# Patient Record
Sex: FEMALE | Race: BLACK OR AFRICAN AMERICAN | NOT HISPANIC OR LATINO | Employment: UNEMPLOYED | URBAN - NONMETROPOLITAN AREA
[De-identification: names, ages, dates, MRNs, and addresses within clinical notes are randomized per-mention and may not be internally consistent; named-entity substitution may affect disease eponyms.]

---

## 2019-06-18 ENCOUNTER — OFFICE VISIT (OUTPATIENT)
Dept: INTERNAL MEDICINE | Facility: CLINIC | Age: 74
End: 2019-06-18

## 2019-06-18 ENCOUNTER — APPOINTMENT (OUTPATIENT)
Dept: LAB | Facility: HOSPITAL | Age: 74
End: 2019-06-18

## 2019-06-18 VITALS
BODY MASS INDEX: 20.57 KG/M2 | HEIGHT: 66 IN | RESPIRATION RATE: 16 BRPM | HEART RATE: 78 BPM | OXYGEN SATURATION: 99 % | TEMPERATURE: 98.4 F | WEIGHT: 128 LBS | DIASTOLIC BLOOD PRESSURE: 57 MMHG | SYSTOLIC BLOOD PRESSURE: 100 MMHG

## 2019-06-18 DIAGNOSIS — I85.00 PORTAL HYPERTENSION WITH ESOPHAGEAL VARICES (HCC): ICD-10-CM

## 2019-06-18 DIAGNOSIS — R10.84 GENERALIZED ABDOMINAL PAIN: ICD-10-CM

## 2019-06-18 DIAGNOSIS — R10.12 LUQ ABDOMINAL PAIN: ICD-10-CM

## 2019-06-18 DIAGNOSIS — I81 PORTAL VEIN THROMBOSIS: ICD-10-CM

## 2019-06-18 DIAGNOSIS — K76.6 PORTAL HYPERTENSION WITH ESOPHAGEAL VARICES (HCC): ICD-10-CM

## 2019-06-18 DIAGNOSIS — K21.00 GASTROESOPHAGEAL REFLUX DISEASE WITH ESOPHAGITIS: ICD-10-CM

## 2019-06-18 DIAGNOSIS — I10 BENIGN ESSENTIAL HYPERTENSION: Primary | ICD-10-CM

## 2019-06-18 LAB
ALBUMIN SERPL-MCNC: 3.7 G/DL (ref 3.5–5)
ALBUMIN/GLOB SERPL: 1.2 G/DL (ref 1–2)
ALP SERPL-CCNC: 35 U/L (ref 38–126)
ALT SERPL W P-5'-P-CCNC: 30 U/L (ref 13–69)
ANION GAP SERPL CALCULATED.3IONS-SCNC: 13.4 MMOL/L (ref 10–20)
ANISOCYTOSIS BLD QL: NORMAL
AST SERPL-CCNC: 53 U/L (ref 15–46)
BASOPHILS # BLD AUTO: 0.08 10*3/MM3 (ref 0–0.2)
BASOPHILS NFR BLD AUTO: 0.8 % (ref 0–1.5)
BILIRUB SERPL-MCNC: 0.6 MG/DL (ref 0.2–1.3)
BUN BLD-MCNC: 22 MG/DL (ref 7–20)
BUN/CREAT SERPL: 27.5 (ref 7.1–23.5)
CALCIUM SPEC-SCNC: 8.7 MG/DL (ref 8.4–10.2)
CHLORIDE SERPL-SCNC: 100 MMOL/L (ref 98–107)
CO2 SERPL-SCNC: 25 MMOL/L (ref 26–30)
CREAT BLD-MCNC: 0.8 MG/DL (ref 0.6–1.3)
DACRYOCYTES BLD QL SMEAR: NORMAL
DEPRECATED RDW RBC AUTO: 40.1 FL (ref 37–54)
EOSINOPHIL # BLD AUTO: 0.35 10*3/MM3 (ref 0–0.4)
EOSINOPHIL NFR BLD AUTO: 3.7 % (ref 0.3–6.2)
ERYTHROCYTE [DISTWIDTH] IN BLOOD BY AUTOMATED COUNT: 21.9 % (ref 12.3–15.4)
GFR SERPL CREATININE-BSD FRML MDRD: 85 ML/MIN/1.73
GLOBULIN UR ELPH-MCNC: 3 GM/DL
GLUCOSE BLD-MCNC: 87 MG/DL (ref 74–98)
HCT VFR BLD AUTO: 17 % (ref 34–46.6)
HGB BLD-MCNC: 4.1 G/DL (ref 12–15.9)
HYPOCHROMIA BLD QL: NORMAL
IMM GRANULOCYTES # BLD AUTO: 0.16 10*3/MM3 (ref 0–0.05)
IMM GRANULOCYTES NFR BLD AUTO: 1.7 % (ref 0–0.5)
LYMPHOCYTES # BLD AUTO: 0.9 10*3/MM3 (ref 0.7–3.1)
LYMPHOCYTES NFR BLD AUTO: 9.5 % (ref 19.6–45.3)
MCH RBC QN AUTO: 13.1 PG (ref 26.6–33)
MCHC RBC AUTO-ENTMCNC: 24.1 G/DL (ref 31.5–35.7)
MCV RBC AUTO: 54.5 FL (ref 79–97)
MICROCYTES BLD QL: NORMAL
MONOCYTES # BLD AUTO: 0.84 10*3/MM3 (ref 0.1–0.9)
MONOCYTES NFR BLD AUTO: 8.9 % (ref 5–12)
NEUTROPHILS # BLD AUTO: 7.13 10*3/MM3 (ref 1.7–7)
NEUTROPHILS NFR BLD AUTO: 75.4 % (ref 42.7–76)
NRBC BLD AUTO-RTO: 0.2 /100 WBC (ref 0–0.2)
OVALOCYTES BLD QL SMEAR: NORMAL
PLATELET # BLD AUTO: 319 10*3/MM3 (ref 140–450)
PMV BLD AUTO: ABNORMAL FL (ref 6–12)
POIKILOCYTOSIS BLD QL SMEAR: NORMAL
POLYCHROMASIA BLD QL SMEAR: NORMAL
POTASSIUM BLD-SCNC: 4.4 MMOL/L (ref 3.5–5.1)
PROT SERPL-MCNC: 6.7 G/DL (ref 6.3–8.2)
RBC # BLD AUTO: 3.12 10*6/MM3 (ref 3.77–5.28)
SMALL PLATELETS BLD QL SMEAR: ADEQUATE
SODIUM BLD-SCNC: 134 MMOL/L (ref 137–145)
TSH SERPL DL<=0.05 MIU/L-ACNC: 3 MIU/ML (ref 0.47–4.68)
WBC MORPH BLD: NORMAL
WBC NRBC COR # BLD: 9.46 10*3/MM3 (ref 3.4–10.8)

## 2019-06-18 PROCEDURE — 99204 OFFICE O/P NEW MOD 45 MIN: CPT | Performed by: INTERNAL MEDICINE

## 2019-06-18 PROCEDURE — 83036 HEMOGLOBIN GLYCOSYLATED A1C: CPT | Performed by: INTERNAL MEDICINE

## 2019-06-18 PROCEDURE — 85007 BL SMEAR W/DIFF WBC COUNT: CPT | Performed by: INTERNAL MEDICINE

## 2019-06-18 PROCEDURE — 80053 COMPREHEN METABOLIC PANEL: CPT | Performed by: INTERNAL MEDICINE

## 2019-06-18 PROCEDURE — 36415 COLL VENOUS BLD VENIPUNCTURE: CPT | Performed by: INTERNAL MEDICINE

## 2019-06-18 PROCEDURE — 85025 COMPLETE CBC W/AUTO DIFF WBC: CPT | Performed by: INTERNAL MEDICINE

## 2019-06-18 PROCEDURE — 84443 ASSAY THYROID STIM HORMONE: CPT | Performed by: INTERNAL MEDICINE

## 2019-06-18 PROCEDURE — 80074 ACUTE HEPATITIS PANEL: CPT | Performed by: INTERNAL MEDICINE

## 2019-06-18 RX ORDER — SPIRONOLACTONE 25 MG/1
25 TABLET ORAL 2 TIMES DAILY
COMMUNITY
End: 2019-06-26 | Stop reason: SDUPTHER

## 2019-06-18 RX ORDER — PROPRANOLOL HYDROCHLORIDE 40 MG/1
40 TABLET ORAL DAILY
COMMUNITY
End: 2019-07-16

## 2019-06-18 RX ORDER — FUROSEMIDE 20 MG/1
20 TABLET ORAL DAILY
Qty: 30 TABLET | Refills: 5
Start: 2019-06-18

## 2019-06-18 RX ORDER — PANTOPRAZOLE SODIUM 40 MG/1
40 TABLET, DELAYED RELEASE ORAL DAILY
Qty: 30 TABLET | Refills: 6
Start: 2019-06-18 | End: 2019-07-18

## 2019-06-18 RX ORDER — HYDROCHLOROTHIAZIDE 25 MG/1
25 TABLET ORAL DAILY
COMMUNITY
End: 2019-06-18

## 2019-06-18 NOTE — PROGRESS NOTES
Subjective   Harry Gonzalez is a 73 y.o. female.     Chief Complaint   Patient presents with   • Establish Care   • Heartburn   • Abdominal Pain   • GI Problem   • Hypertension       History of Present Illness   HPI: Patient is here for an initial visit, she is from [facility] and is visiting her family and she is with her daughter who was translating for her today, patient is here to follow up on the blood pressure  The patient is taking the blood pressure medications as prescribed and has had no side effects. The patient is also here complaining of  Chronic abdominal pain in the left upper quadrant and epigastric area and heartburn, she has brought no paperwork from her physician in [place] dated 2009 which states that patient has portal vein thrombosis, and underwent EGD as she had esophageal varices , no liver cirrhosis , patient states she does not have any other symptoms at this time, according to her daughter she is eating well and denies any other GI symptoms as well.  Daughter says she was taken to a [facility] in [facility] and has undergone CAT scans and an EGD, she was supposed to have a repeat EGD but the patient refused and she is never undergone a colonoscopy, she is currently visiting this country had has no insurance and is returning back to [facility] in August 2019 . she does complain of  Chronic abdominal distention at times.  Hypertension   Pertinent negatives include no chest pain, palpitations or shortness of breath.    Review of Systems   Constitutional: Negative for appetite change, fatigue and fever.   HENT: Negative for congestion, ear discharge, ear pain, sinus pressure and sore throat.    Eyes: Negative for pain and discharge.   Respiratory: Negative for cough, chest tightness, shortness of breath and wheezing.    Cardiovascular: Negative for chest pain, palpitations and leg swelling.   Gastrointestinal: Positive for abdominal distention and abdominal pain. Negative for blood  "in stool, constipation, diarrhea and nausea.   Endocrine: Negative for cold intolerance and heat intolerance.   Genitourinary: Negative for dysuria, flank pain and frequency.   Musculoskeletal: Negative for back pain and joint swelling.   Skin: Negative for color change.   Allergic/Immunologic: Negative for environmental allergies and food allergies.   Neurological: Negative for dizziness, weakness, numbness and headaches.   Hematological: Negative for adenopathy. Does not bruise/bleed easily.   Psychiatric/Behavioral: Negative for behavioral problems and dysphoric mood. The patient is not nervous/anxious.        Past Medical History:   Diagnosis Date   • Portal vein thrombosis    • Renal cyst, left        Past Surgical History:   Procedure Laterality Date   • ENDOSCOPY      2009 juan       History reviewed. No pertinent family history.     reports that she has never smoked. She has never used smokeless tobacco. She reports that she does not drink alcohol or use drugs.    No Known Allergies        Current Outpatient Medications:   •  propranolol (INDERAL) 40 MG tablet, Take 40 mg by mouth Daily., Disp: , Rfl:   •  spironolactone (ALDACTONE) 25 MG tablet, Take 25 mg by mouth 2 (Two) Times a Day., Disp: , Rfl:   •  furosemide (LASIX) 20 MG tablet, Take 1 tablet by mouth Daily., Disp: 30 tablet, Rfl: 5  •  pantoprazole (PROTONIX) 40 MG EC tablet, Take 1 tablet by mouth Daily for 30 days. Half hour prior  To breakfast, Disp: 30 tablet, Rfl: 6      Objective   Blood pressure 100/57, pulse 78, temperature 98.4 °F (36.9 °C), resp. rate 16, height 167.6 cm (66\"), weight 58.1 kg (128 lb), SpO2 99 %.    Physical Exam   Constitutional: She is oriented to person, place, and time. She appears well-developed and well-nourished. No distress.   HENT:   Head: Normocephalic and atraumatic.   Right Ear: External ear normal.   Left Ear: External ear normal.   Nose: Nose normal.   Mouth/Throat: Oropharynx is clear and moist.   Eyes: " Conjunctivae and EOM are normal. Pupils are equal, round, and reactive to light.   Neck: Neck supple. No thyromegaly present.   Cardiovascular: Normal rate, regular rhythm and normal heart sounds.   Pulmonary/Chest: Effort normal and breath sounds normal. No respiratory distress.   Abdominal: Soft. Bowel sounds are normal. She exhibits distension. There is tenderness. There is no rebound.   Luq? spleen   Musculoskeletal: She exhibits no deformity.   Trace bilateral edema   Lymphadenopathy:     She has no cervical adenopathy.   Neurological: She is alert and oriented to person, place, and time.   No gross motor or sensory deficits   Skin: Skin is warm. She is not diaphoretic.   Right side lipoma   Psychiatric: She has a normal mood and affect.   Nursing note and vitals reviewed.      Patient's Body mass index is 20.66 kg/m². BMI is within normal parameters. No follow-up required..      Results for orders placed or performed in visit on 06/18/19   Comprehensive Metabolic Panel   Result Value Ref Range    Glucose 87 74 - 98 mg/dL    BUN 22 (H) 7 - 20 mg/dL    Creatinine 0.80 0.60 - 1.30 mg/dL    Sodium 134 (L) 137 - 145 mmol/L    Potassium 4.4 3.5 - 5.1 mmol/L    Chloride 100 98 - 107 mmol/L    CO2 25.0 (L) 26.0 - 30.0 mmol/L    Calcium 8.7 8.4 - 10.2 mg/dL    Total Protein 6.7 6.3 - 8.2 g/dL    Albumin 3.70 3.50 - 5.00 g/dL    ALT (SGPT) 30 13 - 69 U/L    AST (SGOT) 53 (H) 15 - 46 U/L    Alkaline Phosphatase 35 (L) 38 - 126 U/L    Total Bilirubin 0.6 0.2 - 1.3 mg/dL    eGFR  African Amer 85 >60 mL/min/1.73    Globulin 3.0 gm/dL    A/G Ratio 1.2 1.0 - 2.0 g/dL    BUN/Creatinine Ratio 27.5 (H) 7.1 - 23.5    Anion Gap 13.4 10.0 - 20.0 mmol/L   TSH   Result Value Ref Range    TSH 3.000 0.470 - 4.680 mIU/mL   Hepatitis Panel, Acute   Result Value Ref Range    Hep A IgM Negative Negative    Hepatitis B Surface Ag Negative Negative    Hep B Core IgM Negative Negative    Hep C Virus Ab 0.2 0.0 - 0.9 s/co ratio   CBC Auto  Differential   Result Value Ref Range    WBC 9.46 3.40 - 10.80 10*3/mm3    RBC 3.12 (L) 3.77 - 5.28 10*6/mm3    Hemoglobin 4.1 (C) 12.0 - 15.9 g/dL    Hematocrit 17.0 (C) 34.0 - 46.6 %    MCV 54.5 (L) 79.0 - 97.0 fL    MCH 13.1 (L) 26.6 - 33.0 pg    MCHC 24.1 (L) 31.5 - 35.7 g/dL    RDW 21.9 (H) 12.3 - 15.4 %    RDW-SD 40.1 37.0 - 54.0 fl    MPV  6.0 - 12.0 fL    Platelets 319 140 - 450 10*3/mm3    Neutrophil % 75.4 42.7 - 76.0 %    Lymphocyte % 9.5 (L) 19.6 - 45.3 %    Monocyte % 8.9 5.0 - 12.0 %    Eosinophil % 3.7 0.3 - 6.2 %    Basophil % 0.8 0.0 - 1.5 %    Immature Grans % 1.7 (H) 0.0 - 0.5 %    Neutrophils, Absolute 7.13 (H) 1.70 - 7.00 10*3/mm3    Lymphocytes, Absolute 0.90 0.70 - 3.10 10*3/mm3    Monocytes, Absolute 0.84 0.10 - 0.90 10*3/mm3    Eosinophils, Absolute 0.35 0.00 - 0.40 10*3/mm3    Basophils, Absolute 0.08 0.00 - 0.20 10*3/mm3    Immature Grans, Absolute 0.16 (H) 0.00 - 0.05 10*3/mm3    nRBC 0.2 0.0 - 0.2 /100 WBC   Scan Slide   Result Value Ref Range    Anisocytosis Mod/2+ None Seen    Dacrocytes Slight/1+ None Seen    Hypochromia Mod/2+ None Seen    Microcytes Mod/2+ None Seen    Ovalocytes Slight/1+ None Seen    Poikilocytes Mod/2+ None Seen    Polychromasia Slight/1+ None Seen    WBC Morphology Normal Normal    Platelet Estimate Adequate Normal   Hemoglobin A1c   Result Value Ref Range    Hemoglobin A1C <4.2 (L) 4.8 - 5.6 %         Assessment/Plan   Harry was seen today for establish care, heartburn, abdominal pain, gi problem and hypertension.    Diagnoses and all orders for this visit:    Benign essential hypertension  -     Hemoglobin A1c    Portal vein thrombosis  -     Cancel: CBC & Differential  -     Cancel: Comprehensive Metabolic Panel  -     Cancel: Hepatitis Panel, Acute  -     US Abdomen Complete  -     Ambulatory Referral to Gastroenterology  -     CBC & Differential  -     Comprehensive Metabolic Panel  -     TSH  -     Cancel: Hemoglobin A1c  -     Hepatitis Panel, Acute  -      CBC Auto Differential  -     Scan Slide; Future  -     Scan Slide  -     Hemoglobin A1c    Portal hypertension with esophageal varices (CMS/HCC)  -     Cancel: CBC & Differential  -     Cancel: Comprehensive Metabolic Panel  -     Cancel: Hepatitis Panel, Acute  -     US Abdomen Complete  -     Ambulatory Referral to Gastroenterology  -     CBC & Differential  -     Comprehensive Metabolic Panel  -     TSH  -     Cancel: Hemoglobin A1c  -     Hepatitis Panel, Acute  -     CBC Auto Differential  -     Scan Slide; Future  -     Scan Slide  -     Hemoglobin A1c    Gastroesophageal reflux disease with esophagitis  -     Ambulatory Referral to Gastroenterology  -     Hemoglobin A1c    Generalized abdominal pain  -     US Abdomen Complete  -     Ambulatory Referral to Gastroenterology  -     CBC & Differential  -     Comprehensive Metabolic Panel  -     TSH  -     Cancel: Hemoglobin A1c  -     Hepatitis Panel, Acute  -     CBC Auto Differential  -     Scan Slide; Future  -     Scan Slide  -     Hemoglobin A1c    LUQ abdominal pain  -     US Abdomen Complete  -     Ambulatory Referral to Gastroenterology  -     Hemoglobin A1c    Other orders  -     pantoprazole (PROTONIX) 40 MG EC tablet; Take 1 tablet by mouth Daily for 30 days. Half hour prior  To breakfast  -     furosemide (LASIX) 20 MG tablet; Take 1 tablet by mouth Daily.      Plan:  1.  Benign essential hypertension: We will continue current medication  2.  GERD: Start pantoprazole.  3.  Abdominal pain: We will obtain labs, ultrasound and refer patient to GI  4.  Epigastric and left upper quadrant abdominal pain: We will obtain labs, ultrasound and refer patient to GI  5.  Portal hypertension with esophageal varices : I have readjusted her medication: Will continue propranolol 40 mg daily, stop hydrochlorothiazide, start Lasix 20 mg daily and Aldactone 25 mg twice a day, We will obtain labs, ultrasound and refer patient to GI           Huong Lopez MD

## 2019-06-19 ENCOUNTER — DOCUMENTATION (OUTPATIENT)
Dept: INTERNAL MEDICINE | Facility: CLINIC | Age: 74
End: 2019-06-19

## 2019-06-19 ENCOUNTER — APPOINTMENT (OUTPATIENT)
Dept: LAB | Facility: HOSPITAL | Age: 74
End: 2019-06-19

## 2019-06-19 DIAGNOSIS — D50.8 OTHER IRON DEFICIENCY ANEMIA: Primary | ICD-10-CM

## 2019-06-19 DIAGNOSIS — D64.9 ANEMIA, UNSPECIFIED TYPE: Primary | ICD-10-CM

## 2019-06-19 LAB
ANISOCYTOSIS BLD QL: NORMAL
BASOPHILS # BLD AUTO: 0.07 10*3/MM3 (ref 0–0.2)
BASOPHILS NFR BLD AUTO: 0.6 % (ref 0–1.5)
DACRYOCYTES BLD QL SMEAR: NORMAL
DEPRECATED RDW RBC AUTO: 40.6 FL (ref 37–54)
EOSINOPHIL # BLD AUTO: 0.43 10*3/MM3 (ref 0–0.4)
EOSINOPHIL NFR BLD AUTO: 3.7 % (ref 0.3–6.2)
ERYTHROCYTE [DISTWIDTH] IN BLOOD BY AUTOMATED COUNT: 21.7 % (ref 12.3–15.4)
FERRITIN SERPL-MCNC: 3.79 NG/ML (ref 11.1–264)
HAV IGM SERPL QL IA: NEGATIVE
HBA1C MFR BLD: <4.2 % (ref 4.8–5.6)
HBV CORE IGM SERPL QL IA: NEGATIVE
HBV SURFACE AG SERPL QL IA: NEGATIVE
HCT VFR BLD AUTO: 17.2 % (ref 34–46.6)
HCV AB S/CO SERPL IA: 0.2 S/CO RATIO (ref 0–0.9)
HGB BLD-MCNC: 4.1 G/DL (ref 12–15.9)
HYPOCHROMIA BLD QL: NORMAL
IMM GRANULOCYTES # BLD AUTO: 0.22 10*3/MM3 (ref 0–0.05)
IMM GRANULOCYTES NFR BLD AUTO: 1.9 % (ref 0–0.5)
LYMPHOCYTES # BLD AUTO: 0.96 10*3/MM3 (ref 0.7–3.1)
LYMPHOCYTES NFR BLD AUTO: 8.3 % (ref 19.6–45.3)
MCH RBC QN AUTO: 13.2 PG (ref 26.6–33)
MCHC RBC AUTO-ENTMCNC: 23.8 G/DL (ref 31.5–35.7)
MCV RBC AUTO: 55.3 FL (ref 79–97)
MICROCYTES BLD QL: NORMAL
MONOCYTES # BLD AUTO: 1.06 10*3/MM3 (ref 0.1–0.9)
MONOCYTES NFR BLD AUTO: 9.2 % (ref 5–12)
NEUTROPHILS # BLD AUTO: 8.82 10*3/MM3 (ref 1.7–7)
NEUTROPHILS NFR BLD AUTO: 76.3 % (ref 42.7–76)
NRBC BLD AUTO-RTO: 0.3 /100 WBC (ref 0–0.2)
OVALOCYTES BLD QL SMEAR: NORMAL
PLATELET # BLD AUTO: 325 10*3/MM3 (ref 140–450)
PMV BLD AUTO: ABNORMAL FL (ref 6–12)
POIKILOCYTOSIS BLD QL SMEAR: NORMAL
POLYCHROMASIA BLD QL SMEAR: NORMAL
RBC # BLD AUTO: 3.11 10*6/MM3 (ref 3.77–5.28)
SMALL PLATELETS BLD QL SMEAR: ADEQUATE
WBC MORPH BLD: NORMAL
WBC NRBC COR # BLD: 11.56 10*3/MM3 (ref 3.4–10.8)

## 2019-06-19 PROCEDURE — 85007 BL SMEAR W/DIFF WBC COUNT: CPT | Performed by: INTERNAL MEDICINE

## 2019-06-19 PROCEDURE — 85025 COMPLETE CBC W/AUTO DIFF WBC: CPT | Performed by: INTERNAL MEDICINE

## 2019-06-19 PROCEDURE — 82728 ASSAY OF FERRITIN: CPT | Performed by: INTERNAL MEDICINE

## 2019-06-19 NOTE — PROGRESS NOTES
Patient noted to have hemoglobin of 4.1 and hematocrit of 17 yesterday with repeat CBC today and hemoglobin of 4.1 and hematocrit of 17.2 today, her labs have been discussed in detail with her and her daughter who is her , the patient states that she has no symptoms except for pain in the left upper quadrant, she does complain of occasional glossitis per her daughter, her daughter also states that she has a liver issue for which she saw a specialist in [facility] and went to the [facility] and underwent an EGD and the patient was advised to repeat EGD and she-the patient had refused it, her daughter also states that she has had several CAT scans and labs done in [place], patient has never undergone a colonoscopy according to her daughter, I have discussed the labs in detail and have informed the daughter that I would recommend that the patient go to the ER for her critical labs but the patient is asymptomatic at this time she denies any chest pain ,melena, bleeding for any site, shortness of breath ,dizziness, orthopnea ,the patient is eating well, patient has been provided with a copy of lab results from yesterday and today.  I have informed them that I have put her in to see hematology and GI, patient is taking iron tablets and all her prescribed medications, she also has PPI at home which she is taking, and multivitamin

## 2019-06-21 ENCOUNTER — CONSULT (OUTPATIENT)
Dept: ONCOLOGY | Facility: CLINIC | Age: 74
End: 2019-06-21

## 2019-06-21 ENCOUNTER — INFUSION (OUTPATIENT)
Dept: ONCOLOGY | Facility: HOSPITAL | Age: 74
End: 2019-06-21

## 2019-06-21 VITALS
HEART RATE: 62 BPM | RESPIRATION RATE: 16 BRPM | TEMPERATURE: 98 F | OXYGEN SATURATION: 99 % | DIASTOLIC BLOOD PRESSURE: 54 MMHG | SYSTOLIC BLOOD PRESSURE: 104 MMHG

## 2019-06-21 VITALS
TEMPERATURE: 97.6 F | HEIGHT: 66 IN | DIASTOLIC BLOOD PRESSURE: 49 MMHG | RESPIRATION RATE: 16 BRPM | SYSTOLIC BLOOD PRESSURE: 101 MMHG | WEIGHT: 130 LBS | HEART RATE: 73 BPM | BODY MASS INDEX: 20.89 KG/M2

## 2019-06-21 DIAGNOSIS — D50.0 IRON DEFICIENCY ANEMIA DUE TO CHRONIC BLOOD LOSS: Primary | ICD-10-CM

## 2019-06-21 DIAGNOSIS — D50.0 IRON DEFICIENCY ANEMIA DUE TO CHRONIC BLOOD LOSS: ICD-10-CM

## 2019-06-21 DIAGNOSIS — K90.9 IRON MALABSORPTION: ICD-10-CM

## 2019-06-21 LAB
ABO GROUP BLD: NORMAL
ABO GROUP BLD: NORMAL
BLD GP AB SCN SERPL QL: NEGATIVE
RH BLD: NEGATIVE
RH BLD: NEGATIVE
T&S EXPIRATION DATE: NORMAL

## 2019-06-21 PROCEDURE — 99204 OFFICE O/P NEW MOD 45 MIN: CPT | Performed by: NURSE PRACTITIONER

## 2019-06-21 PROCEDURE — 86850 RBC ANTIBODY SCREEN: CPT | Performed by: NURSE PRACTITIONER

## 2019-06-21 PROCEDURE — P9016 RBC LEUKOCYTES REDUCED: HCPCS

## 2019-06-21 PROCEDURE — 86900 BLOOD TYPING SEROLOGIC ABO: CPT | Performed by: NURSE PRACTITIONER

## 2019-06-21 PROCEDURE — 86920 COMPATIBILITY TEST SPIN: CPT

## 2019-06-21 PROCEDURE — 86900 BLOOD TYPING SEROLOGIC ABO: CPT

## 2019-06-21 PROCEDURE — 63710000001 DIPHENHYDRAMINE PER 50 MG: Performed by: NURSE PRACTITIONER

## 2019-06-21 PROCEDURE — 36415 COLL VENOUS BLD VENIPUNCTURE: CPT

## 2019-06-21 PROCEDURE — 86901 BLOOD TYPING SEROLOGIC RH(D): CPT | Performed by: NURSE PRACTITIONER

## 2019-06-21 PROCEDURE — 36430 TRANSFUSION BLD/BLD COMPNT: CPT

## 2019-06-21 PROCEDURE — 86901 BLOOD TYPING SEROLOGIC RH(D): CPT

## 2019-06-21 RX ORDER — ACETAMINOPHEN 325 MG/1
650 TABLET ORAL ONCE
Status: COMPLETED | OUTPATIENT
Start: 2019-06-21 | End: 2019-06-21

## 2019-06-21 RX ORDER — DIPHENHYDRAMINE HCL 25 MG
25 CAPSULE ORAL ONCE
Status: CANCELLED | OUTPATIENT
Start: 2019-06-24

## 2019-06-21 RX ORDER — DIPHENHYDRAMINE HCL 25 MG
25 CAPSULE ORAL ONCE
Status: CANCELLED | OUTPATIENT
Start: 2019-07-01

## 2019-06-21 RX ORDER — DIPHENHYDRAMINE HCL 25 MG
25 TABLET ORAL ONCE
Status: CANCELLED | OUTPATIENT
Start: 2019-06-21 | End: 2019-06-21

## 2019-06-21 RX ORDER — FAMOTIDINE 10 MG/ML
20 INJECTION, SOLUTION INTRAVENOUS ONCE
Status: CANCELLED | OUTPATIENT
Start: 2019-06-24

## 2019-06-21 RX ORDER — SODIUM CHLORIDE 9 MG/ML
250 INJECTION, SOLUTION INTRAVENOUS AS NEEDED
Status: DISCONTINUED | OUTPATIENT
Start: 2019-06-21 | End: 2019-06-21 | Stop reason: HOSPADM

## 2019-06-21 RX ORDER — ACETAMINOPHEN 325 MG/1
650 TABLET ORAL ONCE
Status: CANCELLED | OUTPATIENT
Start: 2019-06-21 | End: 2019-06-21

## 2019-06-21 RX ORDER — DIPHENHYDRAMINE HCL 25 MG
25 CAPSULE ORAL ONCE
Status: COMPLETED | OUTPATIENT
Start: 2019-06-21 | End: 2019-06-21

## 2019-06-21 RX ORDER — SODIUM CHLORIDE 9 MG/ML
250 INJECTION, SOLUTION INTRAVENOUS AS NEEDED
Status: CANCELLED | OUTPATIENT
Start: 2019-06-21

## 2019-06-21 RX ORDER — SODIUM CHLORIDE 9 MG/ML
250 INJECTION, SOLUTION INTRAVENOUS ONCE
Status: CANCELLED | OUTPATIENT
Start: 2019-07-01

## 2019-06-21 RX ORDER — SODIUM CHLORIDE 9 MG/ML
250 INJECTION, SOLUTION INTRAVENOUS ONCE
Status: CANCELLED | OUTPATIENT
Start: 2019-06-24

## 2019-06-21 RX ORDER — FAMOTIDINE 10 MG/ML
20 INJECTION, SOLUTION INTRAVENOUS ONCE
Status: CANCELLED | OUTPATIENT
Start: 2019-07-01

## 2019-06-21 RX ADMIN — ACETAMINOPHEN 650 MG: 325 TABLET, FILM COATED ORAL at 10:18

## 2019-06-21 RX ADMIN — DIPHENHYDRAMINE HYDROCHLORIDE 25 MG: 25 CAPSULE ORAL at 10:18

## 2019-06-21 NOTE — PROGRESS NOTES
Subjective     PROBLEM LIST:  1. Anemia  2. Unspecified liver problem  3.Portal hypertension Esophageal varices  4. Chronic abdominal distention  5. Chronic abdominal pain left upper quadrant and esophageal area  6. Portal vein thrombosis  7. HTN    CHIEF COMPLAINT: Anemia      HISTORY OF PRESENT ILLNESS:  The patient is a 73 y.o. year old female, referred for for low hgb and anemia. She has recently moved from [facility]. She recently established care with Dr. Lopez. She was found to have a HGB of 4.1. She was advised to go to the ED but declined.  She  denies any chest pain ,melena, bleeding for any site.  Her daughter reports that she has shortness of breath ,dizziness, and orthopnea. She plans to go back to [facility] in 2019. She has had a CT but that was about 2 years ago. She also reports that she had an EGD with recommended follow up but never did.  This was about a year to 2 years.  Timeline is unsure.         REVIEW OF SYSTEMS:  A 14 point review of systems was performed and is negative except as noted above.    Past Medical History:   Diagnosis Date   • Portal vein thrombosis    • Renal cyst, left        GYN History:     Current Outpatient Medications on File Prior to Visit   Medication Sig Dispense Refill   • furosemide (LASIX) 20 MG tablet Take 1 tablet by mouth Daily. 30 tablet 5   • pantoprazole (PROTONIX) 40 MG EC tablet Take 1 tablet by mouth Daily for 30 days. Half hour prior  To breakfast 30 tablet 6   • propranolol (INDERAL) 40 MG tablet Take 40 mg by mouth Daily.     • spironolactone (ALDACTONE) 25 MG tablet Take 25 mg by mouth 2 (Two) Times a Day.       No current facility-administered medications on file prior to visit.        No Known Allergies    Past Surgical History:   Procedure Laterality Date   • ENDOSCOPY       [facility]       Social History     Socioeconomic History   • Marital status:      Spouse name: Not on file   • Number of children: Not on file   • Years of  "education: Not on file   • Highest education level: Not on file   Tobacco Use   • Smoking status: Never Smoker   • Smokeless tobacco: Never Used   Substance and Sexual Activity   • Alcohol use: No     Frequency: Never   • Drug use: No   • Sexual activity: Defer       History reviewed. No pertinent family history.    Objective     /49   Pulse 73   Temp 97.6 °F (36.4 °C) (Temporal)   Resp 16   Ht 167.6 cm (66\")   Wt 59 kg (130 lb)   BMI 20.98 kg/m²   Performance Status: 0  General: well appearing, thin  female in no acute distress  Neuro: alert and oriented  HEENT: sclera anicteric, oropharynx clear  Lymphatics: no cervical, supraclavicular, or axillary adenopathy  Cardiovascular: regular rate and rhythm, no murmurs  Lungs: clear to auscultation bilaterally  Abdomen: soft, She exhibits distention and tenderness on palpation.  No palpable organomegaly  Extremeties: Trace lower extremity edema  Skin: no rashes, lesions, bruising, or petechiae  Psych: mood and affect appropriate            Assessment/Plan     Harry Gonzalez is a 73 y.o. year old female with iron deficiency anemia.  I had a long discussion today with the patient and her daughter about her diagnosis of  anemia. Considering her past history of esophageal varices,  abdominal pain and distention.  Oral iron will not be sufficient to mantain her iron stores due to malabsorption. I reviewed the patient's documents including referring provider's notes and lab results.  I explained to the patient that her anemia is driven by iron deficiency vs potentially blood loss. We will need to transfuse 2 units today for hgb of 4.1. We will attempt to give IV iron to patient next week. She has a GI referral in place for further examination.  I discussed with the daughter and patient that her situation is serious and she will need to go to ED if her mom has worsening shortness of breath, chest pain, or bleeding.  The daughter and patient verbalized undersatnifn. "     The daughter and patient are concerned about paying the bills from this service and future services.  I have consulted with financial counseling to see if they can help.  I also notified pharmacy to see any options through the drug company. The patient is visiting and planning to go back to [facility] in August.            Yessenia Barba, APRN    6/21/2019    I spent a total of  45 minutes in direct patient care, greater than 35    minutes (greater than 50%) were spent in coordination of care, and counseling the patient regarding  Iron deficiency anemia, management.  I answered any questions patient and daughters had with medication and plan.

## 2019-06-22 LAB
ABO + RH BLD: NORMAL
ABO + RH BLD: NORMAL
BH BB BLOOD EXPIRATION DATE: NORMAL
BH BB BLOOD EXPIRATION DATE: NORMAL
BH BB BLOOD TYPE BARCODE: 9500
BH BB BLOOD TYPE BARCODE: 9500
BH BB DISPENSE STATUS: NORMAL
BH BB DISPENSE STATUS: NORMAL
BH BB PRODUCT CODE: NORMAL
BH BB PRODUCT CODE: NORMAL
BH BB UNIT NUMBER: NORMAL
BH BB UNIT NUMBER: NORMAL
CROSSMATCH INTERPRETATION: NORMAL
CROSSMATCH INTERPRETATION: NORMAL
UNIT  ABO: NORMAL
UNIT  ABO: NORMAL
UNIT  RH: NORMAL
UNIT  RH: NORMAL

## 2019-06-24 ENCOUNTER — INFUSION (OUTPATIENT)
Dept: ONCOLOGY | Facility: HOSPITAL | Age: 74
End: 2019-06-24

## 2019-06-24 VITALS
HEART RATE: 65 BPM | RESPIRATION RATE: 18 BRPM | DIASTOLIC BLOOD PRESSURE: 51 MMHG | SYSTOLIC BLOOD PRESSURE: 98 MMHG | TEMPERATURE: 98.8 F

## 2019-06-24 DIAGNOSIS — D50.0 IRON DEFICIENCY ANEMIA DUE TO CHRONIC BLOOD LOSS: Primary | ICD-10-CM

## 2019-06-24 DIAGNOSIS — K90.9 IRON MALABSORPTION: ICD-10-CM

## 2019-06-24 PROCEDURE — 96374 THER/PROPH/DIAG INJ IV PUSH: CPT

## 2019-06-24 PROCEDURE — 63710000001 DIPHENHYDRAMINE PER 50 MG: Performed by: NURSE PRACTITIONER

## 2019-06-24 PROCEDURE — 96365 THER/PROPH/DIAG IV INF INIT: CPT

## 2019-06-24 PROCEDURE — 96375 TX/PRO/DX INJ NEW DRUG ADDON: CPT

## 2019-06-24 PROCEDURE — 25010000002 FERUMOXYTOL 510 MG/17ML SOLUTION 510 MG VIAL: Performed by: NURSE PRACTITIONER

## 2019-06-24 RX ORDER — FAMOTIDINE 10 MG/ML
20 INJECTION, SOLUTION INTRAVENOUS ONCE
Status: COMPLETED | OUTPATIENT
Start: 2019-06-24 | End: 2019-06-24

## 2019-06-24 RX ORDER — SODIUM CHLORIDE 9 MG/ML
250 INJECTION, SOLUTION INTRAVENOUS ONCE
Status: DISCONTINUED | OUTPATIENT
Start: 2019-06-24 | End: 2019-06-24 | Stop reason: HOSPADM

## 2019-06-24 RX ORDER — DIPHENHYDRAMINE HCL 25 MG
25 CAPSULE ORAL ONCE
Status: COMPLETED | OUTPATIENT
Start: 2019-06-24 | End: 2019-06-24

## 2019-06-24 RX ADMIN — FERUMOXYTOL 510 MG: 510 INJECTION INTRAVENOUS at 14:31

## 2019-06-24 RX ADMIN — FAMOTIDINE 20 MG: 10 INJECTION, SOLUTION INTRAVENOUS at 14:27

## 2019-06-24 RX ADMIN — DIPHENHYDRAMINE HYDROCHLORIDE 25 MG: 25 CAPSULE ORAL at 14:27

## 2019-06-25 ENCOUNTER — HOSPITAL ENCOUNTER (OUTPATIENT)
Dept: ULTRASOUND IMAGING | Facility: HOSPITAL | Age: 74
Discharge: HOME | End: 2019-06-25
Admitting: INTERNAL MEDICINE

## 2019-06-25 PROCEDURE — 76700 US EXAM ABDOM COMPLETE: CPT

## 2019-06-26 ENCOUNTER — OFFICE VISIT (OUTPATIENT)
Dept: INTERNAL MEDICINE | Facility: CLINIC | Age: 74
End: 2019-06-26

## 2019-06-26 VITALS
SYSTOLIC BLOOD PRESSURE: 102 MMHG | BODY MASS INDEX: 20.73 KG/M2 | WEIGHT: 129 LBS | RESPIRATION RATE: 16 BRPM | HEIGHT: 66 IN | TEMPERATURE: 98.4 F | OXYGEN SATURATION: 98 % | DIASTOLIC BLOOD PRESSURE: 58 MMHG | HEART RATE: 84 BPM

## 2019-06-26 DIAGNOSIS — R16.1 SPLENOMEGALY: ICD-10-CM

## 2019-06-26 DIAGNOSIS — I85.00 PORTAL HYPERTENSION WITH ESOPHAGEAL VARICES (HCC): Primary | ICD-10-CM

## 2019-06-26 DIAGNOSIS — K76.6 PORTAL HYPERTENSION WITH ESOPHAGEAL VARICES (HCC): Primary | ICD-10-CM

## 2019-06-26 DIAGNOSIS — D50.8 OTHER IRON DEFICIENCY ANEMIA: ICD-10-CM

## 2019-06-26 DIAGNOSIS — I10 BENIGN ESSENTIAL HYPERTENSION: ICD-10-CM

## 2019-06-26 PROCEDURE — 99214 OFFICE O/P EST MOD 30 MIN: CPT | Performed by: INTERNAL MEDICINE

## 2019-06-26 RX ORDER — SPIRONOLACTONE 25 MG/1
25 TABLET ORAL 2 TIMES DAILY
Qty: 60 TABLET | Refills: 4 | Status: SHIPPED | OUTPATIENT
Start: 2019-06-26

## 2019-06-26 NOTE — PROGRESS NOTES
Subjective   Harry Gonzalez is a 73 y.o. female.     Chief Complaint   Patient presents with   • Hypertension   • Anemia       History of Present Illness   Patient is here to follow-up on her blood pressure and leg swelling, she is taking medications as prescribed, she is also to follow-up on her anemia and was seen by hematology and underwent blood transfusion with iron infusion she is also to follow-up on her liver disease she had an ultrasound which also showed gallbladder disorder and splenomegaly    The following portions of the patient's history were reviewed and updated as appropriate: allergies, current medications, past family history, past medical history, past social history, past surgical history and problem list.    Review of Systems   Constitutional: Negative for appetite change, fatigue and fever.   HENT: Negative for congestion, ear discharge, ear pain, sinus pressure and sore throat.    Eyes: Negative for pain and discharge.   Respiratory: Negative for cough, chest tightness, shortness of breath and wheezing.    Cardiovascular: Positive for leg swelling. Negative for chest pain and palpitations.   Gastrointestinal: Negative for abdominal pain, blood in stool, constipation, diarrhea and nausea.   Endocrine: Negative for cold intolerance and heat intolerance.   Genitourinary: Negative for dysuria, flank pain and frequency.   Musculoskeletal: Negative for back pain and joint swelling.   Skin: Negative for color change.   Allergic/Immunologic: Negative for environmental allergies and food allergies.   Neurological: Negative for dizziness, weakness, numbness and headaches.   Hematological: Negative for adenopathy. Does not bruise/bleed easily.   Psychiatric/Behavioral: Negative for behavioral problems and dysphoric mood. The patient is not nervous/anxious.          Current Outpatient Medications:   •  furosemide (LASIX) 20 MG tablet, Take 1 tablet by mouth Daily., Disp: 30 tablet, Rfl: 5  •   "pantoprazole (PROTONIX) 40 MG EC tablet, Take 1 tablet by mouth Daily for 30 days. Half hour prior  To breakfast, Disp: 30 tablet, Rfl: 6  •  propranolol (INDERAL) 40 MG tablet, Take 40 mg by mouth Daily., Disp: , Rfl:   •  spironolactone (ALDACTONE) 25 MG tablet, Take 1 tablet by mouth 2 (Two) Times a Day., Disp: 60 tablet, Rfl: 4    Objective     Blood pressure 102/58, pulse 84, temperature 98.4 °F (36.9 °C), resp. rate 16, height 167.6 cm (65.98\"), weight 58.5 kg (129 lb), SpO2 98 %, not currently breastfeeding.    Physical Exam   Constitutional: She is oriented to person, place, and time. She appears well-developed and well-nourished. No distress.   HENT:   Head: Normocephalic and atraumatic.   Right Ear: External ear normal.   Left Ear: External ear normal.   Nose: Nose normal.   Mouth/Throat: Oropharynx is clear and moist.   Eyes: Conjunctivae and EOM are normal. Pupils are equal, round, and reactive to light.   Neck: Neck supple. No thyromegaly present.   Cardiovascular: Normal rate, regular rhythm and normal heart sounds.   Pulmonary/Chest: Effort normal and breath sounds normal. No respiratory distress.   Abdominal: Soft. Bowel sounds are normal. She exhibits no distension. There is no tenderness. There is no rebound.   Musculoskeletal: Normal range of motion. She exhibits edema.   Lymphadenopathy:     She has no cervical adenopathy.   Neurological: She is alert and oriented to person, place, and time.   No gross motor or sensory deficits   Skin: Skin is warm. She is not diaphoretic.   Psychiatric: She has a normal mood and affect.   Nursing note and vitals reviewed.    Patient's Body mass index is 20.83 kg/m². BMI is within normal parameters. No follow-up required..      Results for orders placed or performed in visit on 06/21/19   Type and screen   Result Value Ref Range    ABO Type O     RH type Negative     Antibody Screen Negative     T&S Expiration Date 6/24/2019 11:59:59 PM    Prepare RBC, 2 Units "   Result Value Ref Range    Product Code N5292G33     Unit Number [id number]     UNIT  ABO O     UNIT  RH NEG     Crossmatch Interpretation Compatible     Dispense Status PT     Blood Type ONEG     Blood Expiration Date 201907252359     Blood Type Barcode 9500     Product Code R6496S57     Unit Number [id number]     UNIT  ABO O     UNIT  RH NEG     Crossmatch Interpretation Compatible     Dispense Status PT     Blood Type ONEG     Blood Expiration Date 201907252359     Blood Type Barcode 9500          Assessment/Plan   Harry was seen today for hypertension and anemia.    Diagnoses and all orders for this visit:    Portal hypertension with esophageal varices (CMS/HCC)    Other iron deficiency anemia    Splenomegaly    Benign essential hypertension    Other orders  -     spironolactone (ALDACTONE) 25 MG tablet; Take 1 tablet by mouth 2 (Two) Times a Day.    plan:  1.  Portal hypertension with esophageal varices: We will continue current medication patient advised to follow-up with GI  2.benign essential hypertension: Currently stable we will continue current medication  3.  Splenomegaly: To follow-up with specialist  4.  Anemia: To follow-up with specialists             Huong Lopez MD

## 2019-06-27 ENCOUNTER — LAB (OUTPATIENT)
Dept: LAB | Facility: HOSPITAL | Age: 74
End: 2019-06-27

## 2019-06-27 ENCOUNTER — OFFICE VISIT (OUTPATIENT)
Dept: GASTROENTEROLOGY | Facility: CLINIC | Age: 74
End: 2019-06-27

## 2019-06-27 VITALS
BODY MASS INDEX: 21.05 KG/M2 | TEMPERATURE: 97.7 F | DIASTOLIC BLOOD PRESSURE: 49 MMHG | SYSTOLIC BLOOD PRESSURE: 101 MMHG | HEIGHT: 66 IN | RESPIRATION RATE: 16 BRPM | WEIGHT: 131 LBS | HEART RATE: 59 BPM

## 2019-06-27 DIAGNOSIS — R10.11 RIGHT UPPER QUADRANT ABDOMINAL PAIN: Primary | ICD-10-CM

## 2019-06-27 DIAGNOSIS — K92.1 MELENA: ICD-10-CM

## 2019-06-27 DIAGNOSIS — D50.0 IRON DEFICIENCY ANEMIA DUE TO CHRONIC BLOOD LOSS: ICD-10-CM

## 2019-06-27 LAB
APTT PPP: 40.7 SECONDS (ref 24.5–37.2)
DEPRECATED RDW RBC AUTO: 93.6 FL (ref 37–54)
ERYTHROCYTE [DISTWIDTH] IN BLOOD BY AUTOMATED COUNT: 35.8 % (ref 12.3–15.4)
HCT VFR BLD AUTO: 30.5 % (ref 34–46.6)
HGB BLD-MCNC: 7.3 G/DL (ref 12–15.9)
INR PPP: 1.23 (ref 0.9–1.1)
MCH RBC QN AUTO: 19 PG (ref 26.6–33)
MCHC RBC AUTO-ENTMCNC: 23.9 G/DL (ref 31.5–35.7)
MCV RBC AUTO: 79.2 FL (ref 79–97)
PLATELET # BLD AUTO: 432 10*3/MM3 (ref 140–450)
PMV BLD AUTO: ABNORMAL FL (ref 6–12)
PROTHROMBIN TIME: 15.9 SECONDS (ref 12–15.1)
RBC # BLD AUTO: 3.85 10*6/MM3 (ref 3.77–5.28)
WBC NRBC COR # BLD: 9.36 10*3/MM3 (ref 3.4–10.8)

## 2019-06-27 PROCEDURE — 85730 THROMBOPLASTIN TIME PARTIAL: CPT

## 2019-06-27 PROCEDURE — 36415 COLL VENOUS BLD VENIPUNCTURE: CPT

## 2019-06-27 PROCEDURE — 85610 PROTHROMBIN TIME: CPT

## 2019-06-27 PROCEDURE — 85027 COMPLETE CBC AUTOMATED: CPT

## 2019-06-27 PROCEDURE — 99204 OFFICE O/P NEW MOD 45 MIN: CPT | Performed by: INTERNAL MEDICINE

## 2019-07-02 ENCOUNTER — INFUSION (OUTPATIENT)
Dept: ONCOLOGY | Facility: HOSPITAL | Age: 74
End: 2019-07-02

## 2019-07-02 ENCOUNTER — OFFICE VISIT (OUTPATIENT)
Dept: ONCOLOGY | Facility: CLINIC | Age: 74
End: 2019-07-02

## 2019-07-02 ENCOUNTER — PREP FOR SURGERY (OUTPATIENT)
Dept: OTHER | Facility: HOSPITAL | Age: 74
End: 2019-07-02

## 2019-07-02 VITALS
RESPIRATION RATE: 15 BRPM | DIASTOLIC BLOOD PRESSURE: 55 MMHG | TEMPERATURE: 97.8 F | BODY MASS INDEX: 20.73 KG/M2 | HEART RATE: 69 BPM | HEIGHT: 66 IN | SYSTOLIC BLOOD PRESSURE: 113 MMHG | WEIGHT: 129 LBS

## 2019-07-02 DIAGNOSIS — K90.9 IRON MALABSORPTION: ICD-10-CM

## 2019-07-02 DIAGNOSIS — D50.9 IRON DEFICIENCY ANEMIA: Primary | ICD-10-CM

## 2019-07-02 DIAGNOSIS — D50.0 IRON DEFICIENCY ANEMIA DUE TO CHRONIC BLOOD LOSS: Primary | ICD-10-CM

## 2019-07-02 DIAGNOSIS — K92.1 MELENA: ICD-10-CM

## 2019-07-02 PROCEDURE — 96374 THER/PROPH/DIAG INJ IV PUSH: CPT

## 2019-07-02 PROCEDURE — 63710000001 DIPHENHYDRAMINE PER 50 MG: Performed by: NURSE PRACTITIONER

## 2019-07-02 PROCEDURE — 96375 TX/PRO/DX INJ NEW DRUG ADDON: CPT

## 2019-07-02 PROCEDURE — 25010000002 FERUMOXYTOL 510 MG/17ML SOLUTION 510 MG VIAL: Performed by: NURSE PRACTITIONER

## 2019-07-02 PROCEDURE — 99213 OFFICE O/P EST LOW 20 MIN: CPT | Performed by: INTERNAL MEDICINE

## 2019-07-02 RX ORDER — SODIUM CHLORIDE 9 MG/ML
250 INJECTION, SOLUTION INTRAVENOUS ONCE
Status: DISCONTINUED | OUTPATIENT
Start: 2019-07-02 | End: 2019-07-02 | Stop reason: HOSPADM

## 2019-07-02 RX ORDER — SODIUM CHLORIDE 9 MG/ML
70 INJECTION, SOLUTION INTRAVENOUS CONTINUOUS PRN
Status: CANCELLED | OUTPATIENT
Start: 2019-07-02

## 2019-07-02 RX ORDER — DIPHENHYDRAMINE HCL 25 MG
25 CAPSULE ORAL ONCE
Status: COMPLETED | OUTPATIENT
Start: 2019-07-02 | End: 2019-07-02

## 2019-07-02 RX ORDER — FAMOTIDINE 10 MG/ML
20 INJECTION, SOLUTION INTRAVENOUS ONCE
Status: COMPLETED | OUTPATIENT
Start: 2019-07-02 | End: 2019-07-02

## 2019-07-02 RX ADMIN — FERUMOXYTOL 510 MG: 510 INJECTION INTRAVENOUS at 14:20

## 2019-07-02 RX ADMIN — DIPHENHYDRAMINE HYDROCHLORIDE 25 MG: 25 CAPSULE ORAL at 14:03

## 2019-07-02 RX ADMIN — FAMOTIDINE 20 MG: 10 INJECTION, SOLUTION INTRAVENOUS at 14:04

## 2019-07-02 NOTE — PROGRESS NOTES
"      PROBLEM LIST:  1. Anemia  2. Unspecified liver problem  3.Portal hypertension Esophageal varices  4. Chronic abdominal distention  5. Chronic abdominal pain left upper quadrant and esophageal area  6. Portal vein thrombosis  7. HTN          Subjective     CHIEF COMPLAINT: iron deficiency anemia.    HISTORY OF PRESENT ILLNESS:   Harry Carcamo returns for follow-up.   She has received 1 dose of feraheme.  She is feeling better.  She has met with Dr. Mayorga and further workup of her liver disease/splenomegaly is planned.      Past Medical History, Past Surgical History, Social History, Family History have been reviewed and are without significant changes except as mentioned.    Review of Systems   A comprehensive 14 point review of systems was performed and was negative except as mentioned.    Medications:  The current medication list was reviewed in the EMR    ALLERGIES:  No Known Allergies    Objective      /55   Pulse 69   Temp 97.8 °F (36.6 °C) (Temporal)   Resp 15   Ht 167.6 cm (66\")   Wt 58.5 kg (129 lb)   LMP  (LMP Unknown)   BMI 20.82 kg/m²      Performance Status: 1    General: well appearing female in no acute distress  Neuro: alert and oriented  HEENT: sclera anicteric, oropharynx clear  Lymphatics: no cervical, supraclavicular, or axillary adenopathy  Cardiovascular: regular rate and rhythm, no murmurs  Lungs: clear to auscultation bilaterally  Abdomen: soft, nontender, nondistended.  No palpable organomegaly  Extremeties: no lower extremity edema  Skin: no rashes, lesions, bruising, or petechiae  Psych: mood and affect appropriate      RECENT LABS:  Results for HARRY CARCAMO (MRN [id number]) as of 7/2/2019 13:54   Ref. Range 6/27/2019 16:14   Protime Latest Ref Range: 12.0 - 15.1 Seconds 15.9 (H)   INR Latest Ref Range: 0.90 - 1.10  1.23 (H)   PTT Latest Ref Range: 24.5 - 37.2 seconds 40.7 (H)   WBC Latest Ref Range: 3.40 - 10.80 10*3/mm3 9.36   RBC Latest Ref Range: 3.77 - 5.28 " 10*6/mm3 3.85   Hemoglobin Latest Ref Range: 12.0 - 15.9 g/dL 7.3 (L)   Hematocrit Latest Ref Range: 34.0 - 46.6 % 30.5 (L)   RDW Latest Ref Range: 12.3 - 15.4 % 35.8 (H)   MCV Latest Ref Range: 79.0 - 97.0 fL 79.2   MCH Latest Ref Range: 26.6 - 33.0 pg 19.0 (L)   MCHC Latest Ref Range: 31.5 - 35.7 g/dL 23.9 (L)   MPV Latest Ref Range: 6.0 - 12.0 fL See Comment   Platelets Latest Ref Range: 140 - 450 10*3/mm3 432   RDW-SD Latest Ref Range: 37.0 - 54.0 fl 93.6 (H)           Assessment/Plan   Harry Gonzalez is a 73 y.o. year old female presenting with severe iron deficiency anemia.    Anemia: 2nd dose feraheme today.  Will recheck cbc in 3-4 weeks.    Splenomegaly: She has a history consistent with portal hypertension, potentially related to undiagnosed liver disease.  She also has a history of a portal vein thrombosis.  She will undergo upper endoscopy with Dr. Platt in the near future.                  I spent 15 minutes with the patient. I spent > 50% percent of this time counseling and discussing prognosis, diagnostic testing, evaluation, current status and management.        Malika Vu MD  [facility] Hematology and Oncology    7/2/2019          CC:

## 2019-07-03 NOTE — PAT
Spoke with Maciej from [facility] who verbalized that they did not have a live  for [place] speaking patients.  The DOS, staff will need to use the portable language line for assistance in communicating with patient.

## 2019-07-10 ENCOUNTER — ANESTHESIA EVENT (OUTPATIENT)
Dept: GASTROENTEROLOGY | Facility: HOSPITAL | Age: 74
End: 2019-07-10

## 2019-07-10 ENCOUNTER — ANESTHESIA (OUTPATIENT)
Dept: GASTROENTEROLOGY | Facility: HOSPITAL | Age: 74
End: 2019-07-10

## 2019-07-10 ENCOUNTER — HOSPITAL ENCOUNTER (OUTPATIENT)
Facility: HOSPITAL | Age: 74
Setting detail: HOSPITAL OUTPATIENT SURGERY
Discharge: HOME | End: 2019-07-10
Attending: INTERNAL MEDICINE | Admitting: INTERNAL MEDICINE

## 2019-07-10 VITALS
TEMPERATURE: 98.1 F | HEART RATE: 65 BPM | RESPIRATION RATE: 18 BRPM | DIASTOLIC BLOOD PRESSURE: 72 MMHG | SYSTOLIC BLOOD PRESSURE: 154 MMHG | OXYGEN SATURATION: 97 %

## 2019-07-10 DIAGNOSIS — K92.1 MELENA: ICD-10-CM

## 2019-07-10 DIAGNOSIS — D50.9 IRON DEFICIENCY ANEMIA: ICD-10-CM

## 2019-07-10 PROCEDURE — 25010000002 PROPOFOL 200 MG/20ML EMULSION: Performed by: NURSE ANESTHETIST, CERTIFIED REGISTERED

## 2019-07-10 PROCEDURE — S0260 H&P FOR SURGERY: HCPCS | Performed by: INTERNAL MEDICINE

## 2019-07-10 PROCEDURE — 43270 EGD LESION ABLATION: CPT | Performed by: INTERNAL MEDICINE

## 2019-07-10 RX ORDER — SODIUM CHLORIDE 0.9 % (FLUSH) 0.9 %
3 SYRINGE (ML) INJECTION AS NEEDED
Status: DISCONTINUED | OUTPATIENT
Start: 2019-07-10 | End: 2019-07-10 | Stop reason: HOSPADM

## 2019-07-10 RX ORDER — LIDOCAINE HYDROCHLORIDE 20 MG/ML
INJECTION, SOLUTION INTRAVENOUS AS NEEDED
Status: DISCONTINUED | OUTPATIENT
Start: 2019-07-10 | End: 2019-07-10 | Stop reason: SURG

## 2019-07-10 RX ORDER — PANTOPRAZOLE SODIUM 40 MG/10ML
40 INJECTION, POWDER, LYOPHILIZED, FOR SOLUTION INTRAVENOUS ONCE
Status: DISCONTINUED | OUTPATIENT
Start: 2019-07-10 | End: 2019-07-10

## 2019-07-10 RX ORDER — PROPOFOL 10 MG/ML
INJECTION, EMULSION INTRAVENOUS AS NEEDED
Status: DISCONTINUED | OUTPATIENT
Start: 2019-07-10 | End: 2019-07-10 | Stop reason: SURG

## 2019-07-10 RX ORDER — SIMETHICONE 20 MG/.3ML
EMULSION ORAL AS NEEDED
Status: DISCONTINUED | OUTPATIENT
Start: 2019-07-10 | End: 2019-07-10 | Stop reason: HOSPADM

## 2019-07-10 RX ORDER — PANTOPRAZOLE SODIUM 40 MG/10ML
40 INJECTION, POWDER, LYOPHILIZED, FOR SOLUTION INTRAVENOUS ONCE
Status: COMPLETED | OUTPATIENT
Start: 2019-07-10 | End: 2019-07-10

## 2019-07-10 RX ORDER — SODIUM CHLORIDE 9 MG/ML
70 INJECTION, SOLUTION INTRAVENOUS CONTINUOUS PRN
Status: DISCONTINUED | OUTPATIENT
Start: 2019-07-10 | End: 2019-07-10 | Stop reason: HOSPADM

## 2019-07-10 RX ADMIN — PROPOFOL 50 MG: 10 INJECTION, EMULSION INTRAVENOUS at 07:50

## 2019-07-10 RX ADMIN — PROPOFOL 30 MG: 10 INJECTION, EMULSION INTRAVENOUS at 08:00

## 2019-07-10 RX ADMIN — PROPOFOL 30 MG: 10 INJECTION, EMULSION INTRAVENOUS at 08:05

## 2019-07-10 RX ADMIN — SODIUM CHLORIDE 70 ML/HR: 9 INJECTION, SOLUTION INTRAVENOUS at 07:13

## 2019-07-10 RX ADMIN — LIDOCAINE HYDROCHLORIDE 50 MG: 20 INJECTION, SOLUTION INTRAVENOUS at 07:50

## 2019-07-10 RX ADMIN — PANTOPRAZOLE SODIUM 40 MG: 40 INJECTION, POWDER, FOR SOLUTION INTRAVENOUS at 09:00

## 2019-07-10 RX ADMIN — PROPOFOL 30 MG: 10 INJECTION, EMULSION INTRAVENOUS at 07:53

## 2019-07-10 RX ADMIN — PROPOFOL 30 MG: 10 INJECTION, EMULSION INTRAVENOUS at 07:56

## 2019-07-10 NOTE — ANESTHESIA POSTPROCEDURE EVALUATION
Patient: Harry Gonzalez    Procedure Summary     Date:  07/10/19 Room / Location:  [facility] ENDOSCOPY 2 / [facility] ENDOSCOPY    Anesthesia Start:  0746 Anesthesia Stop:  0822    Procedure:  ESOPHAGOGASTRODUODENOSCOPY W/ THERMAL ABLATION USING ARGON (N/A Esophagus) Diagnosis:       Esophageal varices (CMS/HCC)      Gastric and duodenal angiodysplasia      (Iron deficiency anemia [D50.9])      (Melena [K92.1])    Surgeon:  Josue Mayorga MD Provider:  Roberto Luna CRNA    Anesthesia Type:  MAC ASA Status:  2          Anesthesia Type: MAC  Last vitals  BP   154/72 (07/10/19 0911)   Temp   98.1 °F (36.7 °C) (07/10/19 0818)   Pulse   65 (07/10/19 0911)   Resp   18 (07/10/19 0911)     SpO2   97 % (07/10/19 0911)     Post Anesthesia Care and Evaluation    Patient location during evaluation: bedside  Patient participation: complete - patient participated  Level of consciousness: awake and sleepy but conscious  Pain management: adequate  Airway patency: patent  Anesthetic complications: No anesthetic complications  PONV Status: none  Cardiovascular status: acceptable  Respiratory status: acceptable  Hydration status: acceptable

## 2019-07-10 NOTE — DISCHARGE INSTRUCTIONS
F EGD REF *******************************************************    Postprocedure instructions.  1. Nothing by mouth for 30 min.  2. Clear liquids diet (No Sodas) for 1 hours.  3. May advance to soft low-fat diet  in 2 hours       Diet otherwise:    1. Low fat diet.    2. Avoid soda beverages, excessive use of coffee and tea.   3. Avoid smoking and alcohol.      Other Instructions:  Call [facility] at [phone] or come to the Emergency Department if you experience the following: Chest pain, abdominal pain, bleeding (vomiting of blood or coffee colored material, black stools or campbell blood in stools),   fever/chills, nausea and vomiting or dizziness.    Follow-up:    Dr. Myaorga in 3-4 weeks.   Office phone #[phone].   If you already have an appointment just keep that appointment.    ************************************************************************************    Notes to the patient and the family from Dr. Mayorga.    Dear patient/family member,    Findings on today's procedure are as follows:  1. Esophageal varices.  There are varicose veins in the food pipe.  No suggestion of recent bleed.    2. Stomach varices.  These are varicose veins in the top portion of the stomach.  No suggestion of recent bleed.  Inflammation of the stomach. Gastritis.    3. Stomach and upper small intestinal angiodysplasia.  These are small blood vessels that can sometimes bleed slowly.  These areas were treated with argon.  4. Small sliding hiatal hernia.    5. No cancer. No active ulcers.    Recommendations:    1. Protonix (Pantoprazole) tablet 40 mg tablet. Take 1 tablet orally in the morning half an hour before eating every day. START THIS MEDICINE TOMORROW MORNING-YOU HAVE HAD AN IV DOSE TODAY IN THE HOSPITAL.  2. Other instructions as above.      Should you have more questions please do not hesitate to talk to the nurse who can call me and let me talk to you.      I hope you feel better.    Josue Mayorga,  M.D., FACP, FACG.

## 2019-07-10 NOTE — H&P
Chief complaint: History of black tarry stools.  Anemia.    History of present illness: History of esophageal varices in the past.    Past medical history:   Past Medical History:   Diagnosis Date   • Anemia    • History of recent blood transfusion 06/21/2019   • Portal vein thrombosis    • Renal cyst, left    • Tattoo    • Wears glasses        Surgical history:    Past Surgical History:   Procedure Laterality Date   • ENDOSCOPY      2009 [facility]       Social history:  Social History     Socioeconomic History   • Marital status:      Spouse name: Not on file   • Number of children: Not on file   • Years of education: Not on file   • Highest education level: Not on file   Tobacco Use   • Smoking status: Never Smoker   • Smokeless tobacco: Never Used   Substance and Sexual Activity   • Alcohol use: No     Frequency: Never   • Drug use: No   • Sexual activity: Defer       Allergies:  Patient has no known allergies.  Latex allergy: None  Contrast allergy: None    Medications:  Medications Prior to Admission   Medication Sig Dispense Refill Last Dose   • furosemide (LASIX) 20 MG tablet Take 1 tablet by mouth Daily. 30 tablet 5 7/9/2019 at 0800   • pantoprazole (PROTONIX) 40 MG EC tablet Take 1 tablet by mouth Daily for 30 days. Half hour prior  To breakfast 30 tablet 6 7/9/2019 at 0800   • propranolol (INDERAL) 40 MG tablet Take 40 mg by mouth Daily.   7/9/2019 at 0800   • spironolactone (ALDACTONE) 25 MG tablet Take 1 tablet by mouth 2 (Two) Times a Day. 60 tablet 4 7/9/2019 at 0800       Review of systems:   Constitutional: No recent: Fever, Weight loss,   Respiratory: No recent: SOB at rest, Cough,   Cardiovascular: No recent: Chest Pains, congestive heart failure or arrhythmias.   Neurological: No recent: Seizures, CVA, TIA.   Genitourinary: No recent: Renal Failure, UTI.  Endocrine: No recent: Worsening of diabetes or thyroid disease.  Musculoskeletal: No recent: Joint swelling.  Hem. Oncology: No recent:  Anemia or bleeding.  Psychiatric: No recent: Worsening of depression or anxiety.     VITAL SIGNS:    Blood pressure 119/69, pulse 84, temperature 98.2 °F (36.8 °C), temperature source Temporal, resp. rate 18, SpO2 99 %, not currently breastfeeding.    PHYSICAL EXAMINATION:   HEENT: Normal.   Lungs: Clear to auscultation.  Heart: No S3, no murmur.    Abdomen: Soft.  BS+ Dist, NT, spleen is palpable.  Positive ascites.  Extremities: No edema.  No cyanosis.  Neuro: Alert X 3. No focal deficit.    Assessment: Melena.  History of iron deficiency anemia.    Plan:   Upper endoscopy/EGD.  Risks/Benefits:  The potential benefits, risk and/or side effects of the procedure and alternatives have been discussed with the patient/authorized representative and questions were answered.

## 2019-07-10 NOTE — OP NOTE
PROCEDURE:  Upper Endoscopy with thermal ablation of nonbleeding gastroduodenal angiodysplasia.    DATE OF PROCEDURE: July 10, 2019.    REFERRING PROVIDER:  Houng Lopez MD.     INSTRUMENT:  Olympus GIF H 190 video endoscope     INDICATIONS OF THE PROCEDURE: This is a 73-year-old female with history of melena and iron deficiency.     BIOPSIES: None.     MEDICATIONS:  MAC.     PHOTOGRAPHS:  Photographs were included in the medical records.     CONSENT/PREPROCEDURE EVALUATION:  Risks, benefits, alternatives and options of the procedure including risks of anesthesia/sedation were discussed and informed consent was obtained prior to the procedure. History and physical examination were performed and nothing precluded the test.     REPORT:  The patient was placed in left lateral decubitus position. Once under the influence of IV sedation, the instrument was inserted into the mouth and esophagus was intubated under direct vision without difficulty.     Esophagus: Large > 5 mm columns of esophageal varices without stigmata of recent bleed were noted.   Z line was noted to be around 40 cm.    A small sliding hiatal hernia less than 3 cm was noted.  No Win's esophagus was seen.     Stomach:  Antrum:  Erythematous gastritis.  Angulus, lesser and greater curves: Normal.  Retroflex examination: Sliding hiatal hernia.  Cardia and fundus: Cardia and gastric varices were seen.  No stigmata of recent bleed were noted.  No portal hypertensive gastropathy was seen.     Body of the stomach: Erythematous gastritis.  Good distensibility of the stomach was achieved no giant folds were noted.   Nonbleeding angiodysplasias were seen.    Pylorus and pyloric channel:  normal.     Duodenum:  Bulb: deformed.  Angiodysplasia was noted with minimal ooze.  Second portion: Scant nonbleeding angiodysplasia were seen.   No scalloping was seen in the second portion of duodenum.   No varices were noted within the duodenum and on limited view of  the proximal jejunum.    Intervention:    1. Gastric angiodysplasias were treated with thermal ablation therapy using argon plasma coagulator (Gastric APC mode/ERBE/Side firing probe).  3 sites were treated.  Hemostasis was achieved.    2. Duodenal angiodysplasias were treated with thermal ablation therapy using argon plasma coagulator (Duodenal APC mode/ERBE/Side firing probe).  3 sites were treated.  Hemostasis was achieved.     The upper GI tract was decompressed and the scope was pulled out of the patient. The patient tolerated the procedure well.     DIAGNOSES:     1. Large > 5 mm esophageal varices without stigmata of recent bleed.  2. Cardia and adjacent gastric fundic varices.  No stigmata of recent bleed.    3. No changes suggestive of portal hypertensive gastropathy were noted.  4. Gastric angiodysplasia.  Nonbleeding.  5. Duodenal angiodysplasia.  One at the bulb with minimal ooze of blood.  6. Erythematous gastritis.  7. Deformed duodenal bulb.  8. Small sliding hiatal hernia less than 3 cm.      RECOMMENDATIONS:  1.  Dietary instructions.  2.  Pantoprazole 40 mg 1 p.o. q.a.m. 1/2 hour before breakfast.  3.  Follow biopsies.  4.  Follow up in office.     Thank you very much for letting me participate in the care of this patient. Please do not hesitate to call me if you have any questions.

## 2019-07-10 NOTE — ANESTHESIA PREPROCEDURE EVALUATION
Anesthesia Evaluation     Patient summary reviewed and Nursing notes reviewed   no history of anesthetic complications:  NPO Solid Status: > 8 hours  NPO Liquid Status: > 8 hours           Airway   Mallampati: II  TM distance: >3 FB  Neck ROM: full  No difficulty expected  Dental - normal exam     Pulmonary - negative pulmonary ROS and normal exam   Cardiovascular - negative cardio ROS and normal exam  Exercise tolerance: good (4-7 METS)    Patient on routine beta blocker and Beta blocker given within 24 hours of surgery        Neuro/Psych- negative ROS  GI/Hepatic/Renal/Endo    (+)   liver disease,     ROS Comment: Portal vein thrombosis  Renal cyst    Musculoskeletal (-) negative ROS    Abdominal  - normal exam   Substance History - negative use     OB/GYN negative ob/gyn ROS         Other        ROS/Med Hx Other: Iron deficiency anemia due to chronic blood loss  Iron malabsorption  Iron deficiency anemia  Melena  Hx of blood transfusion    LANGUAGE BARRIER: TELEPHONIC  UTILIZED. Pt very poor historian.                     Anesthesia Plan    ASA 2     MAC   (Patient advised that intravenous sedation would be utilized as primary anesthetic technique. Every effort will be made to make sure patient is comfortable. Patient advised that they may experience recall of events of the procedure. Patient verbalized understanding and agreed to plan. )  intravenous induction   Anesthetic plan, all risks, benefits, and alternatives have been provided, discussed and informed consent has been obtained with: patient.

## 2019-07-16 ENCOUNTER — LAB (OUTPATIENT)
Dept: LAB | Facility: HOSPITAL | Age: 74
End: 2019-07-16

## 2019-07-16 ENCOUNTER — OFFICE VISIT (OUTPATIENT)
Dept: GASTROENTEROLOGY | Facility: CLINIC | Age: 74
End: 2019-07-16

## 2019-07-16 VITALS
DIASTOLIC BLOOD PRESSURE: 67 MMHG | RESPIRATION RATE: 16 BRPM | BODY MASS INDEX: 20.89 KG/M2 | SYSTOLIC BLOOD PRESSURE: 107 MMHG | WEIGHT: 130 LBS | HEIGHT: 66 IN | HEART RATE: 95 BPM | TEMPERATURE: 98.5 F

## 2019-07-16 DIAGNOSIS — D50.0 IRON DEFICIENCY ANEMIA DUE TO CHRONIC BLOOD LOSS: ICD-10-CM

## 2019-07-16 DIAGNOSIS — D50.0 IRON DEFICIENCY ANEMIA DUE TO CHRONIC BLOOD LOSS: Primary | ICD-10-CM

## 2019-07-16 DIAGNOSIS — R18.8 OTHER ASCITES: ICD-10-CM

## 2019-07-16 LAB
ANION GAP SERPL CALCULATED.3IONS-SCNC: 13 MMOL/L (ref 10–20)
BUN BLD-MCNC: 16 MG/DL (ref 7–20)
BUN/CREAT SERPL: 22.9 (ref 7.1–23.5)
CALCIUM SPEC-SCNC: 8.6 MG/DL (ref 8.4–10.2)
CHLORIDE SERPL-SCNC: 103 MMOL/L (ref 98–107)
CO2 SERPL-SCNC: 27 MMOL/L (ref 26–30)
CREAT BLD-MCNC: 0.7 MG/DL (ref 0.6–1.3)
DEPRECATED RDW RBC AUTO: ABNORMAL FL (ref 37–54)
ERYTHROCYTE [DISTWIDTH] IN BLOOD BY AUTOMATED COUNT: ABNORMAL % (ref 12.3–15.4)
GFR SERPL CREATININE-BSD FRML MDRD: 99 ML/MIN/1.73
GLUCOSE BLD-MCNC: 92 MG/DL (ref 74–98)
HCT VFR BLD AUTO: 32.1 % (ref 34–46.6)
HGB BLD-MCNC: 9.5 G/DL (ref 12–15.9)
MCH RBC QN AUTO: 24.1 PG (ref 26.6–33)
MCHC RBC AUTO-ENTMCNC: 29.6 G/DL (ref 31.5–35.7)
MCV RBC AUTO: 81.3 FL (ref 79–97)
PLATELET # BLD AUTO: 618 10*3/MM3 (ref 140–450)
PMV BLD AUTO: 9.5 FL (ref 6–12)
POTASSIUM BLD-SCNC: 4 MMOL/L (ref 3.5–5.1)
RBC # BLD AUTO: 3.95 10*6/MM3 (ref 3.77–5.28)
SODIUM BLD-SCNC: 139 MMOL/L (ref 137–145)
WBC NRBC COR # BLD: 7.5 10*3/MM3 (ref 3.4–10.8)

## 2019-07-16 PROCEDURE — 36415 COLL VENOUS BLD VENIPUNCTURE: CPT

## 2019-07-16 PROCEDURE — 85027 COMPLETE CBC AUTOMATED: CPT

## 2019-07-16 PROCEDURE — 99214 OFFICE O/P EST MOD 30 MIN: CPT | Performed by: INTERNAL MEDICINE

## 2019-07-16 PROCEDURE — 80048 BASIC METABOLIC PNL TOTAL CA: CPT

## 2019-07-16 RX ORDER — PROPRANOLOL HYDROCHLORIDE 40 MG/1
40 TABLET ORAL DAILY
COMMUNITY
End: 2019-07-16 | Stop reason: SINTOL

## 2019-07-16 RX ORDER — NICOTINE POLACRILEX 4 MG/1
20 GUM, CHEWING ORAL
Qty: 50 EACH | Refills: 0 | COMMUNITY
Start: 2019-07-16

## 2019-07-16 NOTE — PATIENT INSTRUCTIONS
1. Low-salt diet.      2. The patient should avoid salt substitute.    3. Protonix (Pantoprazole) tablet 40 mg tablet. Take 1 tablet orally in the morning half an hour before eating every day.  Once she finishes the current bottle the patient may take omeprazole 20 mg 1 orally in the morning half an hour before breakfast (samples given).    4. Furosemide-Lasix 20 mg 1 orally in the morning daily.    5. Spironolactone-Aldactone 25 mg 1 orally in the morning.    6. STOP  Propranolol-Inderal (the patient is running low blood pressure).    7. STOP taking oral iron pills (the patient is at risk for pill esophagitis).  However, the patient can take liquid iron 67 mg 3 times a day with orange juice or vitamin C.    8. The patient should watch for black tarry appearing stools, bright red blood per rectum and vomiting of blood.  If so the patient should seek medical attention soon.    9. It is recommended that the patient should undergo a CT of abdomen and pelvis with oral and IV contrast including delayed imaging (three-phase CT).  The patient and her daughter will further discuss it with the family and will get back.  10. The patient may need endoscopic variceal ligation in the future.    11. The patient will discuss it with her family regarding future plans.    12. Recommend follow-up CBC, BMP, iron profile and ferritin.  The patient may need further iron infusion.  13. The patient may need further increase of her diuretics depending on her electrolytes, kidney function as well as body fluid state.  14. Her daughter acted as an  for the patient.    For follow-up: The patient-family will call back.

## 2019-07-16 NOTE — PROGRESS NOTES
Chief Complaint   Patient presents with   • Abdominal Pain     History of Present Illness       There is history of RUQ abdominal pain off and on for the last 8 years almost daily.  The pain is gradual in onset, moderate in severity and aching in character.  Frequency being once a week.  The pain may last for couple of days.  There is no radiation of abdominal pain.  Eating worsens the abdominal discomfort.  No definite relieving factors of abdominal pain.    Lately, right upper quadrant abdominal pain has improved.  The patient feels better in terms of nausea.  There is no emesis.       The patient has history of anemia.  She has received 2 units of PRBCs in June 2019.  The patient has also received iron infusion on 2 occasions a total dose of 1020 mg (on June 24 2019 510 mg and on July 2, 2019 510 mg).  The patient had melena.  There is no further suggestion of overt GI bleed (hematemesis, melena or hematochezia).  In July 2019 the patient underwent an upper endoscopy which revealed large > 5 mm esophageal varices without stigmata of recent bleed.  She was also found to have gastric and duodenal angiodysplasia.  1 angiodysplasia at the duodenal bulb was noted to ooze blood.  She was treated with thermal ablation using argon plasma coagulator both in the duodenum and in the stomach.  The patient was also noted to have cardia and adjacent gastric fundic varices.  No portal hypertensive gastropathy was seen.  No active ulcers were noted.  Her duodenal bulb was noted to be deformed.    The patient has a history of reflux off and on for the last several years.  The reflux is moderately severe. Symptoms are described as retrosternal burning sensation, and indigestion.  There is history of occasional regurgitative symptoms.  Frequency being several times per week.  The symptoms are worse at night.  The patient takes acid suppressive therapy with reasonable control of his symptoms.  The patient has been having black  stools off and on.  The patient has some diarrhea described as loose watery stool once or twice perhaps couple of times a week when she eats certain foods.  There is no dysphagia or odynophagia.  The patient has noticed swelling of her legs first noticed about 8 years ago.  There is also history of increased abdominal girth first noticed about 8 years ago.  Her weight is stable over the last 3 weeks.  Currently the patient weighs 130 pounds.  The patient is eating reasonably well.  Her fatigue has improved.    Review of Systems   Constitutional: Positive for fatigue. Negative for appetite change, chills, fever and unexpected weight change.   HENT: Negative for mouth sores, nosebleeds and trouble swallowing.    Eyes: Negative for discharge and redness.   Respiratory: Negative for apnea, cough and shortness of breath.    Cardiovascular: Negative for chest pain, palpitations and leg swelling.   Gastrointestinal: Positive for diarrhea. Negative for abdominal distention, abdominal pain, anal bleeding, blood in stool, constipation, nausea and vomiting.   Endocrine: Negative for cold intolerance, heat intolerance and polydipsia.   Genitourinary: Negative for dysuria, hematuria and urgency.   Musculoskeletal: Negative for arthralgias, joint swelling and myalgias.   Skin: Negative for rash.   Allergic/Immunologic: Negative for food allergies and immunocompromised state.   Neurological: Negative for dizziness, seizures, syncope and headaches.   Hematological: Negative for adenopathy. Does not bruise/bleed easily.   Psychiatric/Behavioral: Negative for dysphoric mood. The patient is not nervous/anxious and is not hyperactive.      Patient Active Problem List   Diagnosis   • Iron deficiency anemia due to chronic blood loss   • Iron malabsorption   • Iron deficiency anemia   • Melena     Past Medical History:   Diagnosis Date   • Anemia    • History of recent blood transfusion 06/21/2019   • Portal vein thrombosis    • Renal  "cyst, left    • Tattoo    • Wears glasses      Past Surgical History:   Procedure Laterality Date   • ENDOSCOPY      2009 juan   • ENDOSCOPY N/A 7/10/2019    Procedure: ESOPHAGOGASTRODUODENOSCOPY W/ THERMAL ABLATION USING ARGON;  Surgeon: Josue Mayorga MD;  Location: [facility] ENDOSCOPY;  Service: Gastroenterology     Family History   Problem Relation Age of Onset   • Colon cancer Neg Hx    • Liver cancer Neg Hx    • Liver disease Neg Hx    • Alcohol abuse Neg Hx      Social History     Tobacco Use   • Smoking status: Never Smoker   • Smokeless tobacco: Never Used   Substance Use Topics   • Alcohol use: No     Frequency: Never       Current Outpatient Medications:   •  furosemide (LASIX) 20 MG tablet, Take 1 tablet by mouth Daily., Disp: 30 tablet, Rfl: 5  •  pantoprazole (PROTONIX) 40 MG EC tablet, Take 1 tablet by mouth Daily for 30 days. Half hour prior  To breakfast, Disp: 30 tablet, Rfl: 6  •  spironolactone (ALDACTONE) 25 MG tablet, Take 1 tablet by mouth 2 (Two) Times a Day., Disp: 60 tablet, Rfl: 4  •  Omeprazole 20 MG tablet delayed-release, Take 20 mg by mouth Every Morning Before Breakfast., Disp: 50 each, Rfl: 0    No Known Allergies    Blood pressure 107/67, pulse 95, temperature 98.5 °F (36.9 °C), resp. rate 16, height 167.6 cm (66\"), weight 59 kg (130 lb), not currently breastfeeding.    Physical Exam   Constitutional: She is oriented to person, place, and time. She appears well-developed and well-nourished. No distress.   HENT:   Head: Normocephalic and atraumatic.   Right Ear: Hearing and external ear normal.   Left Ear: Hearing and external ear normal.   Nose: Nose normal.   Mouth/Throat: Oropharynx is clear and moist and mucous membranes are normal. Mucous membranes are not pale, not dry and not cyanotic. No oral lesions. No oropharyngeal exudate.   Eyes: Conjunctivae and EOM are normal. Right eye exhibits no discharge. Left eye exhibits no discharge. No scleral icterus.   Neck: Trachea normal. " Neck supple. No JVD present. No edema present. No thyroid mass and no thyromegaly present.   Cardiovascular: Normal rate, regular rhythm, S2 normal and normal heart sounds. Exam reveals no gallop, no S3 and no friction rub.   No murmur heard.  Pulmonary/Chest: Effort normal and breath sounds normal. No respiratory distress. She has no wheezes. She has no rales. She exhibits no tenderness.   Abdominal: Soft. Normal appearance and bowel sounds are normal. She exhibits shifting dullness and ascites. She exhibits no distension and no mass. There is splenomegaly. There is no hepatomegaly. There is no tenderness. There is no rigidity, no rebound and no guarding. No hernia.   Musculoskeletal: She exhibits no tenderness or deformity.     Vascular Status -  Her right foot exhibits no edema. Her left foot exhibits no edema.  Lymphadenopathy:     She has no cervical adenopathy.        Left: No supraclavicular adenopathy present.   Neurological: She is alert and oriented to person, place, and time. She has normal strength. No cranial nerve deficit or sensory deficit. She exhibits normal muscle tone. Coordination normal.   Skin: No rash noted. She is not diaphoretic. No cyanosis. No pallor. Nails show no clubbing.   Psychiatric: She has a normal mood and affect. Her behavior is normal. Judgment and thought content normal.   Nursing note and vitals reviewed.  Stigmata of chronic liver disease: Positive palmar erythema.  Asterixis:  None.    Laboratory Testing:  Upon review of medical records:    Dated June 18, 2019 sodium 134 potassium 4.4 chloride 100 CO2 25 BUN 22 serum creatinine 0.80 glucose 87.  Calcium 8.7.  Total protein 6.7.  Albumin 3.70.  T bili 0.6 AST 53 ALT 30 alkaline phosphatase 35.  TSH 3.000.  Hemoglobin A1c <4.2%.  WBC 9.46 hemoglobin 4.1 hematocrit 17.0 MCV 54.5 platelet count 319.   Hepatitis A IgM negative.  Hepatitis B surface antigen negative.  Hepatitis B core IgM negative.  Hepatitis C virus antibody  negative at 0.2.     Dated June 19, 2019 WBC 11.56 hemoglobin 4.1 hematocrit 17.2 MCV 55.3 and platelet count 325.  Ferritin 3.79.    Dated June 27, 2019 WBC 9.36 hemoglobin 7.3 hematocrit 30.5 MCV 79.2 and platelet count 432.  PT 15.9.  INR 1.23.  PTT 40.7.     Abdominal Imaging:  Upon review of medical records:      Dated June 25, 2019 the patient underwent an abdominal ultrasound which revealed: Gallbladder is moderately distended and contains some apparent sludge.  On several images, there is some concern for trace pericholecystic fluid.  Some increased color flow is present near the gallbladder neck which could also be artifactual.  No definite gallbladder wall thickening.  The common duct is near the upper limits of normal for the patient's age, measuring 6.7 mm.  Liver is somewhat diffusely heterogeneous but without obvious focal abnormality.  Small anechoic foci within the left kidney likely represent cysts.  1 of the larger of these measures approximately 2.4 cm.  No hydronephrosis.  Right kidney measures 9.8 cm in length and left kidney measures 9.8 cm.  Spleen is diffusely enlarged measuring 20.8 cm.  There is color flow in the visualized inferior vena cava.  Aorta is normal in caliber and demonstrate color flow.  Color flow is also present within the splenic artery and splenic vein.  No other obvious areas of free fluid identified.  Pancreas is poorly visualized.  Visualized portion is grossly homogeneous.    Procedures:  Upon review of records:    Dated July 10, 2019 patient underwent an upper endoscopy which revealed: Large >5 mm esophageal varices without stigmata of recent bleed.  Cardia and adjacent gastric fundic varices.  No stigmata of recent bleed.  No changes suggestive of portal hypertensive gastropathy were noted.  Gastric angiodysplasia.  Nonbleeding.  Duodenal angiodysplasia.  1 of the bulb with minimal ooze of blood.  Erythematous gastritis.  Deformed duodenal bulb.  Small sliding hiatal  hernia less than 3 cm. No biopsy.    Assessment:      ICD-10-CM ICD-9-CM   1. Iron deficiency anemia due to chronic blood loss D50.0 280.0   2. Other ascites R18.8 789.59         Discussion:  1.     Plan/  Patient Instructions     1. Low-salt diet.      2. The patient should avoid salt substitute.    3. Protonix (Pantoprazole) tablet 40 mg tablet. Take 1 tablet orally in the morning half an hour before eating every day.  Once she finishes the current bottle the patient may take omeprazole 20 mg 1 orally in the morning half an hour before breakfast (samples given).    4. Furosemide-Lasix 20 mg 1 orally in the morning daily.    5. Spironolactone-Aldactone 25 mg 1 orally in the morning.    6. STOP  Propranolol-Inderal (the patient is running low blood pressure).    7. STOP taking oral iron pills (the patient is at risk for pill esophagitis).  However, the patient can take liquid iron 67 mg 3 times a day with orange juice or vitamin C.    8. The patient should watch for black tarry appearing stools, bright red blood per rectum and vomiting of blood.  If so the patient should seek medical attention soon.    9. It is recommended that the patient should undergo a CT of abdomen and pelvis with oral and IV contrast including delayed imaging (three-phase CT).  The patient and her daughter will further discuss it with the family and will get back.  10. The patient may need endoscopic variceal ligation in the future.    11. The patient will discuss it with her family regarding future plans.    12. Recommend follow-up CBC, BMP, iron profile and ferritin.  The patient may need further iron infusion.  13. The patient may need further increase of her diuretics depending on her electrolytes, kidney function as well as body fluid state.  14. Her daughter acted as an  for the patient.    For follow-up: The patient-family will call back.       Josue Mayorga MD

## 2019-07-17 ENCOUNTER — OFFICE VISIT (OUTPATIENT)
Dept: INTERNAL MEDICINE | Facility: CLINIC | Age: 74
End: 2019-07-17

## 2019-07-17 VITALS
BODY MASS INDEX: 20.73 KG/M2 | HEIGHT: 66 IN | HEART RATE: 76 BPM | OXYGEN SATURATION: 98 % | DIASTOLIC BLOOD PRESSURE: 70 MMHG | SYSTOLIC BLOOD PRESSURE: 112 MMHG | WEIGHT: 129 LBS | RESPIRATION RATE: 16 BRPM | TEMPERATURE: 98.8 F

## 2019-07-17 DIAGNOSIS — K76.6 PORTAL HYPERTENSION WITH ESOPHAGEAL VARICES (HCC): ICD-10-CM

## 2019-07-17 DIAGNOSIS — D64.9 ANEMIA, UNSPECIFIED TYPE: ICD-10-CM

## 2019-07-17 DIAGNOSIS — I85.00 PORTAL HYPERTENSION WITH ESOPHAGEAL VARICES (HCC): ICD-10-CM

## 2019-07-17 DIAGNOSIS — I10 BENIGN ESSENTIAL HYPERTENSION: Primary | ICD-10-CM

## 2019-07-17 PROCEDURE — 99214 OFFICE O/P EST MOD 30 MIN: CPT | Performed by: INTERNAL MEDICINE

## 2019-07-17 NOTE — PROGRESS NOTES
Subjective   Harry Gonzalez is a 73 y.o. female.     Chief Complaint   Patient presents with   • Anemia   • Hypertension       History of Present Illness   HPI: Patient is here to follow up on the blood pressure  The patient is taking the diuretics  as prescribed and has had no side effects. The patient is also here to follow up on  Abdomen distension, she was seen by hematology and received iron infusions and also by gi    Hypertension   Pertinent negatives include no chest pain, palpitations or shortness of breath.      The following portions of the patient's history were reviewed and updated as appropriate: allergies, current medications, past family history, past medical history, past social history, past surgical history and problem list.    Review of Systems   Constitutional: Negative for appetite change, fatigue and fever.   HENT: Negative for congestion, ear discharge, ear pain, sinus pressure and sore throat.    Eyes: Negative for pain and discharge.   Respiratory: Negative for cough, chest tightness, shortness of breath and wheezing.    Cardiovascular: Negative for chest pain, palpitations and leg swelling.   Gastrointestinal: Negative for abdominal pain, blood in stool, constipation, diarrhea and nausea.   Endocrine: Negative for cold intolerance and heat intolerance.   Genitourinary: Negative for dysuria, flank pain and frequency.   Musculoskeletal: Negative for back pain and joint swelling.   Skin: Negative for color change.   Allergic/Immunologic: Negative for environmental allergies and food allergies.   Neurological: Negative for dizziness, weakness, numbness and headaches.   Hematological: Negative for adenopathy. Does not bruise/bleed easily.   Psychiatric/Behavioral: Negative for behavioral problems and dysphoric mood. The patient is not nervous/anxious.          Current Outpatient Medications:   •  furosemide (LASIX) 20 MG tablet, Take 1 tablet by mouth Daily., Disp: 30 tablet, Rfl: 5  •   "Omeprazole 20 MG tablet delayed-release, Take 20 mg by mouth Every Morning Before Breakfast., Disp: 50 each, Rfl: 0  •  spironolactone (ALDACTONE) 25 MG tablet, Take 1 tablet by mouth 2 (Two) Times a Day., Disp: 60 tablet, Rfl: 4    Objective     Blood pressure 112/70, pulse 76, temperature 98.8 °F (37.1 °C), resp. rate 16, height 167.6 cm (65.98\"), weight 58.5 kg (129 lb), SpO2 98 %, not currently breastfeeding.    Physical Exam   Constitutional: She is oriented to person, place, and time. She appears well-developed and well-nourished. No distress.   HENT:   Head: Normocephalic and atraumatic.   Right Ear: External ear normal.   Left Ear: External ear normal.   Nose: Nose normal.   Mouth/Throat: Oropharynx is clear and moist.   Eyes: Conjunctivae and EOM are normal. Pupils are equal, round, and reactive to light.   Neck: Neck supple. No thyromegaly present.   Cardiovascular: Normal rate, regular rhythm and normal heart sounds.   Pulmonary/Chest: Effort normal and breath sounds normal. No respiratory distress.   Abdominal: Soft. Bowel sounds are normal. She exhibits distension. There is no tenderness. There is no rebound.   Musculoskeletal: She exhibits deformity. She exhibits no edema.   Left wrist cast    Lymphadenopathy:     She has no cervical adenopathy.   Neurological: She is alert and oriented to person, place, and time.   No gross motor or sensory deficits   Skin: Skin is warm. She is not diaphoretic.   Psychiatric: She has a normal mood and affect.   Nursing note and vitals reviewed.    Patient's Body mass index is 20.83 kg/m².        Results for orders placed or performed in visit on 07/16/19   Basic Metabolic Panel   Result Value Ref Range    Glucose 92 74 - 98 mg/dL    BUN 16 7 - 20 mg/dL    Creatinine 0.70 0.60 - 1.30 mg/dL    Sodium 139 137 - 145 mmol/L    Potassium 4.0 3.5 - 5.1 mmol/L    Chloride 103 98 - 107 mmol/L    CO2 27.0 26.0 - 30.0 mmol/L    Calcium 8.6 8.4 - 10.2 mg/dL    eGFR  African Amer " 99 >60 mL/min/1.73    BUN/Creatinine Ratio 22.9 7.1 - 23.5    Anion Gap 13.0 10.0 - 20.0 mmol/L   CBC (No Diff)   Result Value Ref Range    WBC 7.50 3.40 - 10.80 10*3/mm3    RBC 3.95 3.77 - 5.28 10*6/mm3    Hemoglobin 9.5 (L) 12.0 - 15.9 g/dL    Hematocrit 32.1 (L) 34.0 - 46.6 %    MCV 81.3 79.0 - 97.0 fL    MCH 24.1 (L) 26.6 - 33.0 pg    MCHC 29.6 (L) 31.5 - 35.7 g/dL    RDW  12.3 - 15.4 %    RDW-SD  37.0 - 54.0 fl    MPV 9.5 6.0 - 12.0 fL    Platelets 618 (H) 140 - 450 10*3/mm3         Assessment/Plan   Harry was seen today for anemia and hypertension.    Diagnoses and all orders for this visit:    Benign essential hypertension    Portal hypertension with esophageal varices (CMS/HCC)    Anemia, unspecified type      Plan:  1.  Benign essential hypertension: Will continue current medication , hold inderal, low-sodium diet advised  2. Portal hypertension with esophageal varices and ascites : s/p egd , on pantoprazole and diuretics  3. Anemia : hb 9.5 , improved , s/p iron infusion            Huong Lopez MD

## (undated) DEVICE — SUCTION CANISTER, 1500CC, RIGID: Brand: DEROYAL

## (undated) DEVICE — FIAPC® PROBE W/ FILTER 2200 SC OD 2.3MM/6.9FR; L 2.2M/7.2FT: Brand: ERBE

## (undated) DEVICE — CONMED SCOPE SAVER BITE BLOCK, 20X27 MM: Brand: SCOPE SAVER

## (undated) DEVICE — Device: Brand: DEFENDO AIR/WATER/SUCTION AND BIOPSY VALVE

## (undated) DEVICE — Device

## (undated) DEVICE — FRCP BIOP COLD ENDOJAW ALLGTR W/NDL 2.8X2300MM BLU

## (undated) DEVICE — HYBRID TUBING/CAP SET FOR OLYMPUS® SCOPES: Brand: ERBE

## (undated) DEVICE — ENDOSCOPY PORT CONNECTOR FOR OLYMPUS® SCOPES: Brand: ERBE